# Patient Record
(demographics unavailable — no encounter records)

---

## 2025-01-19 NOTE — HISTORY OF PRESENT ILLNESS
[FreeTextEntry1] : Patient presents today for her biannual exam has had several UTIs since her last visit most notably January 2024 status post right rotator cuff surgery she may have not been taking cranberry or hydrating especially around the time of surgery and postop.  No complaints today except for continued mild pelvic organ prolapse.  She did go to physical therapy for this for approximately 1 month and did not notice much of a change and has ceased doing this since she does not have much in the way of symptoms or has any leakage so for now she is satisfied with basically watching and waiting with continued attention to possible weight loss

## 2025-01-19 NOTE — PHYSICAL EXAM
[Chaperone Present] : A chaperone was present in the examining room during all aspects of the physical examination [FreeTextEntry2] : shannon [No Acute Distress] : in no acute distress [Well developed] : well developed [Well Nourished] : ~L well nourished [Good Hygeine] : demonstrates good hygeine [Oriented x3] : oriented to person, place, and time [Normal Memory] : ~T memory was ~L unimpaired [Normal Mood/Affect] : mood and affect are normal [Normal Lung Sounds] : the lungs were clear to auscultation [Respirations regular] : ~T respiratory rate was regular [Rate & Rhythm Regular] : ~T heart rate and rhythm were normal [No Edema] : ~T edema was not present [Supple] : ~T the neck demonstrated no ~M decrease in suppleness [Thyroid Normal] : the thyroid ~T showed no abnormalities [Symmetrical] : the neck was ~L symmetrical [Mass] : breast mass [Tender] : no tenderness [Nipple Discharge] : no nipple discharge [Mass (___ Cm)] : no ~M [unfilled] abdominal mass was palpated [Tenderness] : ~T no ~M abdominal tenderness observed [H/Smegaly] : no hepatosplenomegaly [Supraclavicular LAD] : no adenopathy noted in supraclavicular lymph nodes [Axillary LAD] : no adenopathy was noted in axillary nodes [Inguinal LAD] : no adenopathy was noted in the inguinal lymph nodes [Warm and Dry] : was warm and dry to touch [Turgor Normal] : skin turgor ~T was normal [Rash/Lesion] : no rash or lesion was noted [Normal Gait] : gait was normal [No Joint Swelling] : there was no joint swelling [No Clubbing, Cyanosis] : no clubbing or cyanosis of the fingernails [Normal Strength/Tone] : muscle strength and tone were normal [Vulvar Atrophy] : vulvar atrophy [Labia Majora] : were normal [Labia Minora] : were normal [Bartholin's Gland] : both Bartholin's glands were normal  [Normal Appearance] : general appearance was normal [Atrophy] : atrophy [Estrogen Effect] : no estrogen effect was observed [Rectocele] : a rectocele [Cystocele] : a cystocele [Discharge] : a  ~M vaginal discharge was present [Scant] : there was scant vaginal bleeding [2] : 2 [Aa ____] : Aa [unfilled] [Ba ____] : Ba [unfilled] [C ____] : C [unfilled] [GH ____] : GH [unfilled] [PB ____] : PB [unfilled] [TVL ____] : TVL  [unfilled] [Ap ____] : Ap [unfilled] [Bp ____] : Bp [unfilled] [D ____] : D [unfilled] [] : I [Uterine Adnexae] : were not tender and not enlarged [Normal rectal exam] : was normal [Normal] : was normal [None] : no [de-identified] : There was some slight inflammation much less than lv and pap hpv affirm up to date and neg

## 2025-01-19 NOTE — REVIEW OF SYSTEMS
[Eyesight Problems] : eyesight problems [Limb Pain] : limb pain [All Other ROS] : all other reviewed systems are negative [FreeTextEntry1] : mario alberto rt rotator cuff jan 2024 Kipness/march

## 2025-01-19 NOTE — COUNSELING
[FreeTextEntry1] : update February 26, 2024 1-patient has noted several UTIs especially January 2024 she has Cipro on hand if she needs it and currently has no signs and symptoms of a UTI however urinalysis culture and cytology were sent POCT negative today also recent sonogram of the kidney and bladder was normal no hydronephrosis or lesions present POCT negative PVR normal 2-physical exam normal breast and pelvic today with the exception of friable cervix Pap HPV and affirm taken will treat as necessary vaginal estrogen suggested to the patient who prefers at this point to treat infections as an if they exist and if current situation continues in terms of discharge her UTIs she will consider vaginal estrogen as well as possible methenamine 1 to half pill daily-also in terms of reducing UTIs and suggesting d-mannose and cranberry daily to her 3-pelvic organ prolapse is stable patient will graduate to a pessary or surgery as she is interested in the meantime she will try to do pelvic floor muscle exercise on her own and attend to some continued weight loss even a small amount of weight loss will make a big difference I believe in reducing the pressure on the bladder fundus.  Patient does have literature in this regard and is well versed on the topic 4-again pelvic and renal ultrasound normal recently 5-DEXA is reordered as she did not have a prescription for this 3-Sgunipkindt-yhat was negative recently with no polyps noted although she had a polyp in the past this was not noted on the most recent colonoscopy- Dr. Agustin her gastroenterologist asked the patient to have this repeated in approximately 5 years (may be due in 1-2 years)  Literature was provided, computer modeling utilized to describe the patient's condition to her. She has had the opportunity to ask questions which have been answered to her apparent satisfaction Follow-up appointment July was made to review all of the results and to ensure that there is a treatment plan actually in place and being followed  I also have asked her to please increase hydration and emptying every 2-3 hours while she is awake in order to flush the urinary tract and consider cranberry supplementation samples were provided up to date w/dds, vaccines ex shingles, rsv (too new) Her next appointment will be in 6 mos aug 2024 [facility]  Update 8/12/24 1-patient has noted several UTIs especially January 2024 she has Cipro on hand if she needs it and currently has no signs and symptoms of a UTI however urinalysis culture and cytology were sent POCT negative today also recent sonogram of the kidney and bladder was normal no hydronephrosis or lesions present POCT negative PVR normal-no UTIs since the last visit no symptoms of UTI and her POCT is negative as well as PVR 2024 radiologic studies are normal and urine culture analysis and cytology sent and - February 2, 2002 4 at last visit 2-physical exam normal breast and pelvic today with the exception of friable cervix -Pap and HPV are negative and up-to-date she will consider vaginal estrogen as well as possible methenamine 1 to half pill daily-also in terms of reducing UTIs and suggesting d-mannose and cranberry daily to her 3-pelvic organ prolapse is stable patient will graduate to a pessary or surgery as she is interested in the meantime she will try to do pelvic floor muscle exercise on her own and attend to some continued weight loss even a small amount of weight loss will make a big difference I believe in reducing the pressure on the bladder fundus.  Patient does have literature in this regard and is well versed on the topic-I have reviewed other treatments with her today including continued Kegel exercises pelvic floor muscle exercise was taught and reinforced again, she tried the the tampon but that was expelled, and I also suggested peritif-home prme -to her and she will try and order this to see if it will help her 4-again pelvic and renal ultrasound normal rpt 2025 5-DEXA nl rpt 2026 -2027 4-Jbdrpyoszmm-yoev was negative recently with no polyps noted although she had a polyp in the past this was not noted on the most recent colonoscopy- Dr. Agustin her gastroenterologist asked the patient to have this repeated in approximately 6 years (may be due will call office)  Literature was provided, computer modeling utilized to describe the patient's condition to her. She has had the opportunity to ask questions which have been answered to her apparent satisfaction Follow-up appointment July was made to review all of the results and to ensure that there is a treatment plan actually in place and being followed  I also have asked her to please increase hydration and emptying every 2-3 hours while she is awake in order to flush the urinary tract and consider cranberry supplementation samples were provided up to date w/dds, vaccines ex shingles, rsv (too new) Her next appointment will be in 6-8 mos [facility]

## 2025-01-19 NOTE — OB HISTORY
[Total Preg ___] : : [unfilled] [Vaginal ___] : [unfilled] vaginal delivery(s) [ ___] : [unfilled]  section delivery(s) [unknown] : the patient is unsure of the date of her LMP [Sexually Active] : sexually active [Frequency per month ___] : engages in intercourse [unfilled] times per month

## 2025-02-26 NOTE — COUNSELING
[FreeTextEntry1] : update February 26, 2024 1-patient has noted several UTIs especially January 2024 she has Cipro on hand if she needs it and currently has no signs and symptoms of a UTI however urinalysis culture and cytology were sent POCT negative today also recent sonogram of the kidney and bladder was normal no hydronephrosis or lesions present POCT negative PVR normal 2-physical exam normal breast and pelvic today with the exception of friable cervix Pap HPV and affirm taken will treat as necessary vaginal estrogen suggested to the patient who prefers at this point to treat infections as an if they exist and if current situation continues in terms of discharge her UTIs she will consider vaginal estrogen as well as possible methenamine 1 to half pill daily-also in terms of reducing UTIs and suggesting d-mannose and cranberry daily to her 3-pelvic organ prolapse is stable patient will graduate to a pessary or surgery as she is interested in the meantime she will try to do pelvic floor muscle exercise on her own and attend to some continued weight loss even a small amount of weight loss will make a big difference I believe in reducing the pressure on the bladder fundus.  Patient does have literature in this regard and is well versed on the topic 4-again pelvic and renal ultrasound normal recently 5-DEXA is reordered as she did not have a prescription for this 1-Ucenbryxamb-qlzm was negative recently with no polyps noted although she had a polyp in the past this was not noted on the most recent colonoscopy- Dr. Agustin her gastroenterologist asked the patient to have this repeated in approximately 5 years (may be due in 1-2 years)  Literature was provided, computer modeling utilized to describe the patient's condition to her. She has had the opportunity to ask questions which have been answered to her apparent satisfaction Follow-up appointment July was made to review all of the results and to ensure that there is a treatment plan actually in place and being followed  I also have asked her to please increase hydration and emptying every 2-3 hours while she is awake in order to flush the urinary tract and consider cranberry supplementation samples were provided up to date w/dds, vaccines ex shingles, rsv (too new) Her next appointment will be in 6 mos aug 2024 [facility]  Update 8/12/24 1-patient has noted several UTIs especially January 2024 she has Cipro on hand if she needs it and currently has no signs and symptoms of a UTI however urinalysis culture and cytology were sent POCT negative today also recent sonogram of the kidney and bladder was normal no hydronephrosis or lesions present POCT negative PVR normal-no UTIs since the last visit no symptoms of UTI and her POCT is negative as well as PVR 2024 radiologic studies are normal and urine culture analysis and cytology sent and - February 2, 2002 4 at last visit 2-physical exam normal breast and pelvic today with the exception of friable cervix -Pap and HPV are negative and up-to-date she will consider vaginal estrogen as well as possible methenamine 1 to half pill daily-also in terms of reducing UTIs and suggesting d-mannose and cranberry daily to her 3-pelvic organ prolapse is stable patient will graduate to a pessary or surgery as she is interested in the meantime she will try to do pelvic floor muscle exercise on her own and attend to some continued weight loss even a small amount of weight loss will make a big difference I believe in reducing the pressure on the bladder fundus.  Patient does have literature in this regard and is well versed on the topic-I have reviewed other treatments with her today including continued Kegel exercises pelvic floor muscle exercise was taught and reinforced again, she tried the the tampon but that was expelled, and I also suggested peritif-home prme -to her and she will try and order this to see if it will help her 4-again pelvic and renal ultrasound normal rpt 2025 5-DEXA nl rpt 2026 -2027 9-Oqvptpqmcxd-dtik was negative recently with no polyps noted although she had a polyp in the past this was not noted on the most recent colonoscopy- Dr. Agustin her gastroenterologist asked the patient to have this repeated in approximately 6 years (may be due will call office)  Literature was provided, computer modeling utilized to describe the patient's condition to her. She has had the opportunity to ask questions which have been answered to her apparent satisfaction Follow-up appointment July was made to review all of the results and to ensure that there is a treatment plan actually in place and being followed  I also have asked her to please increase hydration and emptying every 2-3 hours while she is awake in order to flush the urinary tract and consider cranberry supplementation samples were provided up to date w/dds, vaccines ex shingles, rsv (too new) Her next appointment will be in 6-8 mos [facility]  Update 2/26/25 Breast and pelvic exam nl, pop no change, wt no change-would like to consider pessary (daily use only) rather than surgery, pt x 6 sessions did not notice a difference, occ does pfme on her own. no actual leakage, occ urge at capacity if she waits too long. Pelvic sono 10mm ro benign cyst, nl otherwise, nl renal sono 1-patient has noted several UTIs 2024, she has Cipro on hand, none recently-will begin d-mannose, cranberry daily and >hydration 2-physical exam normal breast and pelvic today with the exception of friable cervix -Pap and HPV are negative and up-to-date she will consider vaginal estrogen as well as possible methenamine 1 to half pill daily-also in terms of reducing UTIs and suggesting d-mannose and cranberry daily to her as above 3-pelvic organ prolapse is stable patient will graduate to a pessary or surgery as she is interested in the meantime she will try to do pelvic floor muscle exercise on her own and attend to some continued weight loss even a small amount of weight loss will make a big difference I believe in reducing the pressure on the bladder fundus.  Patient does have literature in this regard and is well versed on the topic-I have reviewed other treatments with her today including continued Kegel exercises pelvic floor muscle exercise was taught and reinforced again, she tried the the tampon but that was expelled, and I also suggested peritif-home prme -to her and she will try and order this to see if it will help her-actually as of today she will lmk if she wants to try a pessary (see detail above) 4-again pelvic and renal ultrasound normal rpt 2025 fall 5-DEXA nl rpt 2026 -2027 6-Jnbulttzvjl-hdlb was negative 2024, small polyp rpt 5 years 7-br sono stressed to pt eren due to volume of tissue and dense breasts  Literature was provided, computer modeling utilized to describe the patient's condition to her. She has had the opportunity to ask questions which have been answered to her apparent satisfaction Follow-up appointment 2/26   I also have asked her to please increase hydration and emptying every 2-3 hours while she is awake in order to flush the urinary tract and consider cranberry supplementation samples were provided up to date w/dds, vaccines ex shingles, rsv suggested Her next appointment will be in 12 mos [facility]

## 2025-02-26 NOTE — PHYSICAL EXAM
[19159] : A chaperone was present during the pelvic exam. [FreeTextEntry2] : shannon [Tender] : no tenderness [Nipple Discharge] : no nipple discharge [Mass (___ Cm)] : no ~M [unfilled] abdominal mass was palpated [Tenderness] : ~T no ~M abdominal tenderness observed [H/Smegaly] : no hepatosplenomegaly [Supraclavicular LAD] : no adenopathy noted in supraclavicular lymph nodes [Axillary LAD] : no adenopathy was noted in axillary nodes [Inguinal LAD] : no adenopathy was noted in the inguinal lymph nodes [Rash/Lesion] : no rash or lesion was noted [Estrogen Effect] : no estrogen effect was observed [de-identified] : There was some slight inflammation much less than lv and pap hpv affirm up to date and neg